# Patient Record
Sex: MALE | Race: WHITE | NOT HISPANIC OR LATINO | Employment: STUDENT | URBAN - METROPOLITAN AREA
[De-identification: names, ages, dates, MRNs, and addresses within clinical notes are randomized per-mention and may not be internally consistent; named-entity substitution may affect disease eponyms.]

---

## 2021-02-03 ENCOUNTER — HOSPITAL ENCOUNTER (EMERGENCY)
Facility: HOSPITAL | Age: 21
Discharge: HOME | End: 2021-02-03
Attending: EMERGENCY MEDICINE | Admitting: EMERGENCY MEDICINE
Payer: COMMERCIAL

## 2021-02-03 VITALS
HEIGHT: 69 IN | HEART RATE: 85 BPM | TEMPERATURE: 97.9 F | OXYGEN SATURATION: 98 % | DIASTOLIC BLOOD PRESSURE: 78 MMHG | WEIGHT: 220 LBS | RESPIRATION RATE: 16 BRPM | SYSTOLIC BLOOD PRESSURE: 133 MMHG | BODY MASS INDEX: 32.58 KG/M2

## 2021-02-03 DIAGNOSIS — K08.89 PAIN, DENTAL: Primary | ICD-10-CM

## 2021-02-03 PROCEDURE — 99283 EMERGENCY DEPT VISIT LOW MDM: CPT

## 2021-02-03 PROCEDURE — 99284 EMERGENCY DEPT VISIT MOD MDM: CPT | Performed by: PHYSICIAN ASSISTANT

## 2021-02-03 PROCEDURE — 64450 NJX AA&/STRD OTHER PN/BRANCH: CPT | Performed by: PHYSICIAN ASSISTANT

## 2021-02-03 RX ORDER — LIDOCAINE HYDROCHLORIDE 20 MG/ML
5 INJECTION, SOLUTION EPIDURAL; INFILTRATION; INTRACAUDAL; PERINEURAL ONCE
Status: COMPLETED | OUTPATIENT
Start: 2021-02-03 | End: 2021-02-03

## 2021-02-03 RX ORDER — BUPIVACAINE HYDROCHLORIDE 5 MG/ML
5 INJECTION, SOLUTION EPIDURAL; INTRACAUDAL ONCE
Status: COMPLETED | OUTPATIENT
Start: 2021-02-03 | End: 2021-02-03

## 2021-02-03 RX ORDER — NAPROXEN 500 MG/1
500 TABLET ORAL 2 TIMES DAILY WITH MEALS
Qty: 28 TABLET | Refills: 0 | Status: SHIPPED | OUTPATIENT
Start: 2021-02-03 | End: 2021-02-17

## 2021-02-03 RX ADMIN — LIDOCAINE HYDROCHLORIDE 5 ML: 20 INJECTION, SOLUTION EPIDURAL; INFILTRATION; INTRACAUDAL; PERINEURAL at 18:00

## 2021-02-03 RX ADMIN — BUPIVACAINE HYDROCHLORIDE 5 ML: 5 INJECTION, SOLUTION EPIDURAL; INTRACAUDAL; PERINEURAL at 18:00

## 2021-02-04 NOTE — ED PROVIDER NOTES
History  Chief Complaint   Patient presents with    Dental Pain     Pt was at dentist 1/27/2021 and told he needed a root canal to right lower tooth, insurance does not take at that dentist  Pain is severe today  22 yo otherwise healthy male presents to the Emergency Department for evaluation of R lower tooth pain  He reportedly saw his dentist today and was advised that he needed an extraction; he refused this and presents here due to continued pain  Pain has been ongoing x 3 months  No fevers, chills, sweats, facial swelling, dysphagia or voice changes  No neck pain  Taking acetaminophen w/o relief  None       History reviewed  No pertinent past medical history  Past Surgical History:   Procedure Laterality Date    TONSILECTOMY AND ADNOIDECTOMY      TONSILLECTOMY      WISDOM TOOTH EXTRACTION         History reviewed  No pertinent family history  I have reviewed and agree with the history as documented  E-Cigarette/Vaping    E-Cigarette Use Current Every Day User     Cartridges/Day 6mg      E-Cigarette/Vaping Substances    Nicotine Yes     THC No     CBD No     Flavoring No     Other No     Unknown No      Social History     Tobacco Use    Smoking status: Former Smoker   Substance Use Topics    Alcohol use: Yes     Frequency: 2-4 times a month     Drinks per session: 3 or 4     Binge frequency: Never    Drug use: Not Currently     Types: Marijuana       Review of Systems   Constitutional: Negative for chills, diaphoresis and fever  HENT: Positive for dental problem  Eyes: Negative for visual disturbance  Respiratory: Negative for cough and shortness of breath  Cardiovascular: Negative for chest pain and palpitations  Gastrointestinal: Negative for abdominal pain, diarrhea, nausea and vomiting  Genitourinary: Negative for dysuria, flank pain and frequency  Musculoskeletal: Negative for arthralgias and myalgias  Skin: Negative for color change, rash and wound  Allergic/Immunologic: Negative for immunocompromised state  Neurological: Negative for dizziness and light-headedness  Hematological: Does not bruise/bleed easily  Psychiatric/Behavioral: Negative for confusion  The patient is not nervous/anxious  Physical Exam  Physical Exam  Vitals signs reviewed  Constitutional:       General: He is not in acute distress  Appearance: He is well-developed  He is not diaphoretic  HENT:      Head: Normocephalic and atraumatic  Comments: Numerous dental caries  No focal gingival/dental abscess  No sublingual bogginess or rigidity  Oropharynx clear     Mouth/Throat:      Mouth: Mucous membranes are moist    Eyes:      General: No scleral icterus  Pupils: Pupils are equal, round, and reactive to light  Neck:      Vascular: No JVD  Cardiovascular:      Rate and Rhythm: Normal rate and regular rhythm  Heart sounds: No murmur  No friction rub  No gallop  Pulmonary:      Effort: No respiratory distress  Breath sounds: No wheezing or rales  Skin:     General: Skin is warm and dry  Neurological:      Mental Status: He is alert and oriented to person, place, and time           Vital Signs  ED Triage Vitals [02/03/21 1617]   Temperature Pulse Respirations Blood Pressure SpO2   97 9 °F (36 6 °C) 85 16 133/78 98 %      Temp Source Heart Rate Source Patient Position - Orthostatic VS BP Location FiO2 (%)   Temporal Monitor Sitting Left arm --      Pain Score       Worst Possible Pain           Vitals:    02/03/21 1617   BP: 133/78   Pulse: 85   Patient Position - Orthostatic VS: Sitting         Visual Acuity      ED Medications  Medications   bupivacaine (PF) (MARCAINE) 0 5 % injection 5 mL (5 mL Injection Given 2/3/21 1800)   lidocaine (PF) (XYLOCAINE-MPF) 2 % injection 5 mL (5 mL Infiltration Given 2/3/21 1800)       Diagnostic Studies  Results Reviewed     None                 No orders to display              Procedures  Nerve block    Date/Time: 2/3/2021 6:00 PM  Performed by: Kiarra Atkinson PA-C  Authorized by: Kiarra Atkinson PA-C     Patient location:  ED  [place] Protocol:  Consent: Verbal consent obtained  Risks and benefits: risks, benefits and alternatives were discussed  Consent given by: patient  Patient understanding: patient states understanding of the procedure being performed  Patient consent: the patient's understanding of the procedure matches consent given  Procedure consent: procedure consent matches procedure scheduled  Relevant documents: relevant documents present and verified  Required items: required blood products, implants, devices, and special equipment available  Patient identity confirmed: verbally with patient      Indications:     Indications:  Pain relief  Location:     Body area:  Head    Head nerve blocked: infra-alveolar  Nerve type:  Peripheral    Laterality:  Right  Skin anesthesia (see MAR for exact dosages):     Skin anesthesia method:  None  Procedure details (see MAR for exact dosages): Block needle gauge:  25 G    Anesthetic injected:  Bupivacaine 0 5% w/o epi and lidocaine 2% w/o epi    Steroid injected:  None    Additive injected:  None    Injection procedure:  Anatomic landmarks identified, anatomic landmarks palpated, introduced needle, negative aspiration for blood and incremental injection  Post-procedure details:     Dressing:  None    Outcome:  Pain relieved    Patient tolerance of procedure: Tolerated well, no immediate complications             ED Course  ED Course as of Feb 04 1328   Wed Feb 03, 2021   1805 Dental block administered                                SBIRT 22yo+      Most Recent Value   SBIRT (25 yo +)   In order to provide better care to our patients, we are screening all of our patients for alcohol and drug use  Would it be okay to ask you these screening questions? Yes Filed at: 02/03/2021 4929   Initial Alcohol Screen: US AUDIT-C    1   How often do you have a drink containing alcohol?  0 Filed at: 02/03/2021 1622   2  How many drinks containing alcohol do you have on a typical day you are drinking? 0 Filed at: 02/03/2021 1622   3a  Male UNDER 65: How often do you have five or more drinks on one occasion? 0 Filed at: 02/03/2021 1622   3b  FEMALE Any Age, or MALE 65+: How often do you have 4 or more drinks on one occassion? 0 Filed at: 02/03/2021 1622   Audit-C Score  0 Filed at: 02/03/2021 1622   THOMAS: How many times in the past year have you    Used an illegal drug or used a prescription medication for non-medical reasons? Never Filed at: 02/03/2021 1622                    MDM  Number of Diagnoses or Management Options  Pain, dental: new and does not require workup  Diagnosis management comments: No e/o infectious process  Encouraged dental f/u for appropriate care       Amount and/or Complexity of Data Reviewed  Review and summarize past medical records: yes        Disposition  Final diagnoses:   Pain, dental     Time reflects when diagnosis was documented in both MDM as applicable and the Disposition within this note     Time User Action Codes Description Comment    2/3/2021  6:11 PM Nora Davis [K08 89] Pain, dental       ED Disposition     ED Disposition Condition Date/Time Comment    Discharge Stable Wed Feb 3, 2021  6:11 PM Chaim Bro discharge to home/self care  Follow-up Information     Follow up With Specialties Details Why Contact Info    [facility] and [address] [place] [address] Road  Schedule an appointment as soon as possible for a visit   [address]  [phone]          Discharge Medication List as of 2/3/2021  6:12 PM      START taking these medications    Details   naproxen (NAPROSYN) 500 mg tablet Take 1 tablet (500 mg total) by mouth 2 (two) times a day with meals for 14 days, Starting Wed 2/3/2021, Until Wed 2/17/2021, Normal           No discharge procedures on file      PDMP Review None          ED Provider  Electronically Signed by           Charlie Potter PA-C  02/04/21 2197

## 2022-07-12 ENCOUNTER — HOSPITAL ENCOUNTER (EMERGENCY)
Facility: HOSPITAL | Age: 22
Discharge: HOME | End: 2022-07-12
Attending: EMERGENCY MEDICINE
Payer: COMMERCIAL

## 2022-07-12 VITALS
DIASTOLIC BLOOD PRESSURE: 75 MMHG | WEIGHT: 220 LBS | SYSTOLIC BLOOD PRESSURE: 122 MMHG | BODY MASS INDEX: 33.34 KG/M2 | RESPIRATION RATE: 18 BRPM | TEMPERATURE: 97.9 F | HEART RATE: 89 BPM | OXYGEN SATURATION: 98 % | HEIGHT: 68 IN

## 2022-07-12 DIAGNOSIS — U07.1 COVID-19: Primary | ICD-10-CM

## 2022-07-12 DIAGNOSIS — J02.9 SORE THROAT: ICD-10-CM

## 2022-07-12 PROCEDURE — 96372 THER/PROPH/DIAG INJ SC/IM: CPT

## 2022-07-12 PROCEDURE — 99284 EMERGENCY DEPT VISIT MOD MDM: CPT

## 2022-07-12 PROCEDURE — 99284 EMERGENCY DEPT VISIT MOD MDM: CPT | Performed by: EMERGENCY MEDICINE

## 2022-07-12 RX ORDER — KETOROLAC TROMETHAMINE 30 MG/ML
15 INJECTION, SOLUTION INTRAMUSCULAR; INTRAVENOUS ONCE
Status: COMPLETED | OUTPATIENT
Start: 2022-07-12 | End: 2022-07-12

## 2022-07-12 RX ADMIN — DEXAMETHASONE 10 MG: 4 TABLET ORAL at 13:01

## 2022-07-12 RX ADMIN — KETOROLAC TROMETHAMINE 15 MG: 30 INJECTION, SOLUTION INTRAMUSCULAR at 13:01

## 2022-07-12 NOTE — ED PROVIDER NOTES
History  Chief Complaint   Patient presents with    Dehydration     Pt states he came to the er because he has covid and feels that he "needs fluids"  Has only had a cup of water  Also states having body aches  Patient is a 58-year-old male who presents with congestion, sore throat, cough, body aches x2 days  Patient reports that he took a home COVID test and was positive  Because of the sore throat, patient states that he has not been eating/drinking because it hurts his throat  He is still able to eat and drink, he has just choosing not to  Denies any drooling, change in voice, neck pain or stiffness  Prior to Admission Medications   Prescriptions Last Dose Informant Patient Reported? Taking?   naproxen (NAPROSYN) 500 mg tablet   No No   Sig: Take 1 tablet (500 mg total) by mouth 2 (two) times a day with meals for 14 days      Facility-Administered Medications: None       History reviewed  No pertinent past medical history  Past Surgical History:   Procedure Laterality Date    TONSILECTOMY AND ADNOIDECTOMY      TONSILLECTOMY      WISDOM TOOTH EXTRACTION         History reviewed  No pertinent family history  I have reviewed and agree with the history as documented  E-Cigarette/Vaping    E-Cigarette Use Current Every Day User     Cartridges/Day 6mg      E-Cigarette/Vaping Substances    Nicotine Yes     THC No     CBD No     Flavoring No     Other No     Unknown No      Social History     Tobacco Use    Smoking status: Former Smoker    Smokeless tobacco: Never Used   Vaping Use    Vaping Use: Every day    Substances: Nicotine   Substance Use Topics    Alcohol use: Yes    Drug use: Not Currently     Types: Marijuana       Review of Systems   Constitutional: Positive for fatigue  Negative for chills and fever  HENT: Positive for congestion, rhinorrhea and sore throat   Negative for dental problem, drooling, ear pain, facial swelling, tinnitus, trouble swallowing and voice change  Respiratory: Positive for cough  Negative for shortness of breath  Cardiovascular: Negative for chest pain and palpitations  Gastrointestinal: Negative for abdominal pain, constipation, diarrhea, nausea and vomiting  Genitourinary: Negative for dysuria, flank pain and hematuria  Musculoskeletal: Positive for myalgias  Negative for neck pain and neck stiffness  Skin: Negative for color change, pallor and rash  Neurological: Negative for dizziness, weakness, light-headedness, numbness and headaches  Physical Exam  Physical Exam  Vitals and nursing note reviewed  Constitutional:       General: He is not in acute distress  Appearance: Normal appearance  He is not ill-appearing, toxic-appearing or diaphoretic  HENT:      Head: Normocephalic and atraumatic  Right Ear: Tympanic membrane normal       Left Ear: Tympanic membrane normal       Nose: Congestion present  Mouth/Throat:      Mouth: Mucous membranes are moist       Pharynx: Oropharynx is clear  Posterior oropharyngeal erythema present  No oropharyngeal exudate  Eyes:      Extraocular Movements: Extraocular movements intact  Conjunctiva/sclera: Conjunctivae normal       Pupils: Pupils are equal, round, and reactive to light  Cardiovascular:      Rate and Rhythm: Normal rate and regular rhythm  Pulses: Normal pulses  Heart sounds: Normal heart sounds  No murmur heard  Pulmonary:      Effort: Pulmonary effort is normal  No respiratory distress  Breath sounds: Normal breath sounds  No stridor  No wheezing, rhonchi or rales  Chest:      Chest wall: No tenderness  Abdominal:      General: Bowel sounds are normal  There is no distension  Palpations: Abdomen is soft  Tenderness: There is no abdominal tenderness  There is no guarding or rebound  Musculoskeletal:      Cervical back: Normal range of motion and neck supple  No rigidity or tenderness  Right lower leg: No edema  Left lower leg: No edema  Lymphadenopathy:      Cervical: No cervical adenopathy  Skin:     General: Skin is warm and dry  Neurological:      General: No focal deficit present  Mental Status: He is alert and oriented to person, place, and time  Mental status is at baseline  Psychiatric:         Mood and Affect: Mood normal          Behavior: Behavior normal          Vital Signs  ED Triage Vitals   Temperature Pulse Respirations Blood Pressure SpO2   07/12/22 0957 07/12/22 0955 07/12/22 0955 07/12/22 0955 07/12/22 0955   97 9 °F (36 6 °C) 89 18 122/75 98 %      Temp Source Heart Rate Source Patient Position - Orthostatic VS BP Location FiO2 (%)   07/12/22 0957 07/12/22 0955 07/12/22 0955 07/12/22 0955 --   Temporal Monitor Sitting Left arm       Pain Score       07/12/22 1144       2           Vitals:    07/12/22 0955   BP: 122/75   Pulse: 89   Patient Position - Orthostatic VS: Sitting         Visual Acuity      ED Medications  Medications   ketorolac (TORADOL) injection 15 mg (15 mg Intramuscular Given 7/12/22 1301)   dexamethasone (DECADRON) tablet 10 mg (10 mg Oral Given 7/12/22 1301)       Diagnostic Studies  Results Reviewed     None                 No orders to display              Procedures  Procedures         ED Course                               SBIRT 22yo+    Flowsheet Row Most Recent Value   SBIRT (25 yo +)    In order to provide better care to our patients, we are screening all of our patients for alcohol and drug use  Would it be okay to ask you these screening questions? Yes Filed at: 07/12/2022 1145   Initial Alcohol Screen: US AUDIT-C     1  How often do you have a drink containing alcohol? 0 Filed at: 07/12/2022 1145   2  How many drinks containing alcohol do you have on a typical day you are drinking? 0 Filed at: 07/12/2022 1145   3a  Male UNDER 65: How often do you have five or more drinks on one occasion? 0 Filed at: 07/12/2022 1145   3b  FEMALE Any Age, or MALE 65+:  How often do you have 4 or more drinks on one occassion? 0 Filed at: 07/12/2022 1145   Audit-C Score 0 Filed at: 07/12/2022 1145   THOMAS: How many times in the past year have you    Used an illegal drug or used a prescription medication for non-medical reasons? Never Filed at: 07/12/2022 1145                    MDM  Number of Diagnoses or Management Options  COVID-19  Sore throat  Diagnosis management comments: Assessment and plan:  76-year-old male presenting with known COVID-19 and sore throat  Patient is well-appearing/nontoxic  Vital signs are stable  Discussed with patient supportive management including Tylenol/Motrin, drinking plenty of fluids, using honey and throat lozenges  Here, will treat with Decadron and Toradol  Counseled regarding return precautions which patient verbalized understanding of  Disposition  Final diagnoses:   COVID-19   Sore throat     Time reflects when diagnosis was documented in both MDM as applicable and the Disposition within this note     Time User Action Codes Description Comment    7/12/2022 12:58 PM Michael Mcghee Add [U07 1] COVID-19     7/12/2022 12:58 PM Michael Mcghee Add [J02 9] Sore throat       ED Disposition     ED Disposition   Discharge    Condition   Stable    Date/Time   Tue Jul 12, 2022 12:58 PM    Comment   Florinda Bell discharge to home/self care                 Follow-up Information     Follow up With Specialties Details Why Contact Info Additional Information    Vee West MD  Schedule an appointment as soon as possible for a visit in 3 days for re-evaluation [address]  [address]  [address]  [facility] [phone]        [address] Emergency Department Emergency Medicine Go to  As needed, If symptoms worsen, for re-evaluation [address] [place],[address] [id number]  [address] [facility] Emergency Department, [address] [facility] Ethan Goins Luige Jeevan 10 Discharge Medication List as of 7/12/2022  1:01 PM      CONTINUE these medications which have NOT CHANGED    Details   naproxen (NAPROSYN) 500 mg tablet Take 1 tablet (500 mg total) by mouth 2 (two) times a day with meals for 14 days, Starting Wed 2/3/2021, Until Wed 2/17/2021, Normal             No discharge procedures on file      PDMP Review     None          ED Provider  Electronically Signed by           Shalom Moreira DO  07/12/22 1235

## 2023-01-05 ENCOUNTER — TELEPHONE (OUTPATIENT)
Dept: PSYCHIATRY | Facility: CLINIC | Age: 23
End: 2023-01-05

## 2023-01-05 NOTE — TELEPHONE ENCOUNTER
Behavioral Health Outpatient Intake Questions    Referred By   : self    Please advise interviewee that they need to answer all questions truthfully to allow for best care, and any misrepresentations of information may affect their ability to be seen at this clinic   => Was this discussed? Yes     If Minor Child (under age 25)    Who is/are the legal guardian(s) of the child? Is there a custody agreement? No     • If "YES"- Custody orders must be obtained prior to scheduling the first appointment  • In addition, Consent to Treatment must be signed by all legal guardians prior to scheduling the first appointment    • If "NO"- Consent to Treatment must be signed by all legal guardians prior to scheduling the first appointment    [address] History -     Presenting Problem (in patient's own words): Help with life    Are there any communication barriers for this patient? Yes                                               If yes, please describe barriers: ADD  • If there is a unique situation, please refer to [facility] for final determination  Are you taking any psychiatric medications? No   •   If "YES" -What are they No   •   If "YES" -Who prescribes? Has the Patient previously received outpatient Talk Therapy or Medication Management from [place] [address]  Yes     •    If "YES"- When, Where and with Whom? •    If "NO" -Has Patient received these services elsewhere? •   If "YES" -When, Where, and with Whom? [facility] 5+ years ago    Has the Patient abused alcohol or other substances in the last 6 months ? Yes  There are suspicions of alcohol abuse reported by the patient  •  If "YES" -What substance, How much, How often? Liquors $ worth at a bar  Has decreased intake over the last couple of months    •  If illegal substance: Refer to [facility] (for S[facility]) or [facility]    •  If Alcohol in excess of 10 drinks per week:  Refer to [facility] (for [facility]) or [facility]    Legal History-     Is this treatment court ordered? No   • If "Yes"- refer to [address] [place] [address] for final determination  Has the Patient been convicted of a felony? No  •  If "Yes" -When, What? • Talk Therapy : Send to [address] [place] [address] for final determination   • Med Management: Send to Dr Antoinette Martin for final determination     ACCEPTED as a patient No  • If "Yes" Appointment Date:     Referred Elsewhere? No  • If “Yes” - (Where? Ex: [address], [address] [facility], etc )   Sent message to [address] [place] [address] for clinical eval    Name of Insurance Co: [employer]oses[employer] Amador22 ID# [id number]  Insurance Phone #  If ins is primary or secondary? Primary  If patient is a minor, parents information such as Name, D  O B of guarantor

## 2023-01-06 ENCOUNTER — TELEPHONE (OUTPATIENT)
Dept: PSYCHIATRY | Facility: CLINIC | Age: 23
End: 2023-01-06

## 2023-01-06 NOTE — TELEPHONE ENCOUNTER
Called patient to inform him that the clinical team determined that [facility] would be a better fit for him   Transferred call to [facility]

## 2023-01-23 ENCOUNTER — TELEPHONE (OUTPATIENT)
Dept: PSYCHIATRY | Facility: CLINIC | Age: 23
End: 2023-01-23

## 2023-01-23 NOTE — TELEPHONE ENCOUNTER
Behavioral Health Outpatient Intake Questions    Referred By   : PCP    Please advise interviewee that they need to answer all questions truthfully to allow for best care, and any misrepresentations of information may affect their ability to be seen at this clinic   => Was this discussed? Yes     If Minor Child (under age 25)    Who is/are the legal guardian(s) of the child? Is there a custody agreement? No     • If "YES"- Custody orders must be obtained prior to scheduling the first appointment  • In addition, Consent to Treatment must be signed by all legal guardians prior to scheduling the first appointment    • If "NO"- Consent to Treatment must be signed by all legal guardians prior to scheduling the first appointment    [address] History -     Presenting Problem (in patient's own words): depression and anger    Are there any communication barriers for this patient? No                                               If yes, please describe barriers:   • If there is a unique situation, please refer to [address] [place] [address] for final determination  Are you taking any psychiatric medications? No   •   If "YES" -What are they   •   If "YES" -Who prescribes? Has the Patient previously received outpatient Talk Therapy or Medication Management from [facility]   NO    •    If "YES"- When, Where and with Whom? •    If "NO" -Has Patient received these services elsewhere? •   If "YES" -When, Where, and with Whom? [facility] in middle school  Has the Patient abused alcohol or other substances in the last 6 months ? No  No concerns of substance abuse are reported  •  If "YES" -What substance, How much, How often? •  If illegal substance: Refer to [facility] (for [facility]) or [employer]  •  If Alcohol in excess of 10 drinks per week:  Refer to [facility] (for [facility]) or [address] History-     Is this treatment court ordered?  No   • If "Yes"- refer to [address] [place] [address] for final determination  Has the Patient been convicted of a felony? No  •  If "Yes" -When, What? • Talk Therapy : Send to [address] [place] [address] for final determination   • Med Management: Send to Dr Roger Kwan for final determination     ACCEPTED as a patient Yes  • If "Yes" Appointment Date: 2/1 @ 9a, with [facility]    Referred Elsewhere? No  • If “Yes” - (Where? Ex: [facility], [facility], [address] [place] [address], etc )       Name of Insurance Co: [employer] [id number] ID# [id number]  [id number] Phone #  If ins is primary or secondary? primary  If patient is a minor, parents information such as Name, D  O B of guarantor

## 2023-02-01 ENCOUNTER — SOCIAL WORK (OUTPATIENT)
Dept: BEHAVIORAL/MENTAL HEALTH CLINIC | Facility: CLINIC | Age: 23
End: 2023-02-01

## 2023-02-01 DIAGNOSIS — F31.78 BIPOLAR I DISORDER, MOST RECENT EPISODE MIXED, IN FULL REMISSION (HCC): Primary | ICD-10-CM
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
## 2023-02-15 ENCOUNTER — SOCIAL WORK (OUTPATIENT)
Dept: BEHAVIORAL/MENTAL HEALTH CLINIC | Facility: CLINIC | Age: 23
End: 2023-02-15

## 2023-02-15 DIAGNOSIS — F31.78 BIPOLAR I DISORDER, MOST RECENT EPISODE MIXED, IN FULL REMISSION (HCC): Primary | ICD-10-CM

## 2023-02-15 NOTE — PSYCH
Behavioral Health Psychotherapy Progress Note    Psychotherapy Provided: Individual Psychotherapy     No diagnosis found  Goals addressed in session: Goal 1     DATA: Met with client for his second visit  He reports that he did not go to his former girlfriends father's , but instead watched it online  He was emotional for it  He also reports that he just discovered he is going to be a father again, now with his present girlfriend  He became so emotional over this that he had to pull over his car to weep uncontrollably  He agrees that his mood dysregulation is a problem and is open to see a psychiatrist for medications  I email the request and informed him he will get a call about availability with a provider  During this session, this clinician used the following therapeutic modalities: Engagement Strategies    Substance Abuse was not addressed during this session  If the client is diagnosed with a co-occurring substance use disorder, please indicate any changes in the frequency or amount of use: Stage of change for addressing substance use diagnoses: No substance use/Not applicable    ASSESSMENT:  Daniele Mariee presents with a Euthymic/ normal mood  his affect is Normal range and intensity, which is congruent, with his mood and the content of the session  The client has made progress on their goals  Daniele Mariee presents with a none risk of suicide, none risk of self-harm, and none risk of harm to others  For any risk assessment that surpasses a "low" rating, a safety plan must be developed  A safety plan was indicated: no  If yes, describe in detail N/A    PLAN: Between sessions, Daniele Mariee will look at decreasing his low frustration tolerance  At the next session, the therapist will use Engagement Strategies to address coping skills  Behavioral Health Treatment Plan and Discharge Planning: Daniele Mariee is aware of and agrees to continue to work on their treatment plan   They have identified and are working toward their discharge goals  {YES    Visit start and stop times:    02/15/23  Start Time: 0855  Stop Time: 0940  Total Visit Time: 45 minutes

## 2023-03-20 ENCOUNTER — OFFICE VISIT (OUTPATIENT)
Dept: PSYCHIATRY | Facility: CLINIC | Age: 23
End: 2023-03-20

## 2023-03-20 DIAGNOSIS — F31.9 BIPOLAR AFFECTIVE DISORDER, REMISSION STATUS UNSPECIFIED (HCC): Primary | ICD-10-CM

## 2023-03-20 DIAGNOSIS — F41.8 OTHER SPECIFIED ANXIETY DISORDERS: ICD-10-CM

## 2023-03-20 RX ORDER — CARBAMAZEPINE 200 MG/1
200 TABLET ORAL 2 TIMES DAILY
Qty: 60 TABLET | Refills: 1 | Status: SHIPPED | OUTPATIENT
Start: 2023-03-20
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
## 2023-03-21 PROBLEM — F31.70 BIPOLAR AFFECTIVE DISORDER IN REMISSION (HCC): Status: ACTIVE | Noted: 2023-03-21

## 2023-03-21 PROBLEM — F41.8 OTHER SPECIFIED ANXIETY DISORDERS: Status: ACTIVE | Noted: 2023-03-21

## 2023-03-30 ENCOUNTER — SOCIAL WORK (OUTPATIENT)
Dept: BEHAVIORAL/MENTAL HEALTH CLINIC | Facility: CLINIC | Age: 23
End: 2023-03-30

## 2023-03-30 DIAGNOSIS — F31.70 BIPOLAR AFFECTIVE DISORDER IN REMISSION (HCC): Primary | ICD-10-CM
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
## 2023-04-04 PROBLEM — F31.78 BIPOLAR I DISORDER, MOST RECENT EPISODE MIXED, IN FULL REMISSION (HCC): Status: ACTIVE | Noted: 2023-04-04

## 2023-04-26 ENCOUNTER — OFFICE VISIT (OUTPATIENT)
Dept: PSYCHIATRY | Facility: CLINIC | Age: 23
End: 2023-04-26

## 2023-04-26 DIAGNOSIS — F31.0 BIPOLAR AFFECTIVE DISORDER, CURRENT EPISODE HYPOMANIC (HCC): Primary | ICD-10-CM

## 2023-04-26 DIAGNOSIS — F41.8 OTHER SPECIFIED ANXIETY DISORDERS: ICD-10-CM

## 2023-04-26 RX ORDER — ZIPRASIDONE HYDROCHLORIDE 20 MG/1
20 CAPSULE ORAL 2 TIMES DAILY WITH MEALS
Qty: 60 CAPSULE | Refills: 1 | Status: SHIPPED | OUTPATIENT
Start: 2023-04-26
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
## 2023-04-28 ENCOUNTER — SOCIAL WORK (OUTPATIENT)
Dept: BEHAVIORAL/MENTAL HEALTH CLINIC | Facility: CLINIC | Age: 23
End: 2023-04-28

## 2023-04-28 DIAGNOSIS — F31.78 BIPOLAR I DISORDER, MOST RECENT EPISODE MIXED, IN FULL REMISSION (HCC): Primary | ICD-10-CM
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
## 2023-05-11 ENCOUNTER — TELEPHONE (OUTPATIENT)
Dept: PSYCHIATRY | Facility: CLINIC | Age: 23
End: 2023-05-11

## 2023-05-11 NOTE — TELEPHONE ENCOUNTER
Patient is calling regarding cancelling an appointment      Date/Time: 5/12/23/3:00 PM    Reason: N/A    Patient was rescheduled: YES [x] NO []  If yes, when was Patient reschedule for:   6/2/23 at 3PM    Patient requesting call back to reschedule: YES [] NO [x]

## 2023-06-02 ENCOUNTER — SOCIAL WORK (OUTPATIENT)
Dept: BEHAVIORAL/MENTAL HEALTH CLINIC | Facility: CLINIC | Age: 23
End: 2023-06-02

## 2023-06-02 DIAGNOSIS — F31.78 BIPOLAR I DISORDER, MOST RECENT EPISODE MIXED, IN FULL REMISSION (HCC): Primary | ICD-10-CM

## 2023-06-02 NOTE — PSYCH
"Behavioral Health Psychotherapy Progress Note    Psychotherapy Provided: Individual Psychotherapy     1  Bipolar I disorder, most recent episode mixed, in full remission ([address] )            Goals addressed in session: Goal 1     DATA: Client reports that for the most part he is doing well with less anxiety and anger  His girlfriend is 5 months pregnant with some medical complications that he has handled well along with continued car problems that he has used excellent problem solving skills  He continues to read and practice the anger management skills from the book provided  During this session, this clinician used the following therapeutic modalities: Client-centered Therapy    Substance Abuse was not addressed during this session  If the client is diagnosed with a co-occurring substance use disorder, please indicate any changes in the frequency or amount of use:  Stage of change for addressing substance use diagnoses: No substance use/Not applicable    ASSESSMENT:  Balaji Kirby presents with a Euthymic/ normal mood  his affect is Normal range and intensity, which is congruent, with his mood and the content of the session  The client has made progress on their goals  Balaji Kirby presents with a none risk of suicide, none risk of self-harm, and none risk of harm to others  For any risk assessment that surpasses a \"low\" rating, a safety plan must be developed  A safety plan was indicated: no  If yes, describe in detail N/A    PLAN: Between sessions, Balaji Kirby will continue to explore coping skills  At the next session, the therapist will use Client-centered Therapy to address adding a multitude of coping skills  Behavioral Health Treatment Plan and Discharge Planning: Balaji Kirby is aware of and agrees to continue to work on their treatment plan  They have identified and are working toward their discharge goals   YES    Visit start and stop times:    06/02/23  Start Time: 1503  Stop Time: 1537  Total Visit " Time: 34 minutes

## 2023-06-16 ENCOUNTER — SOCIAL WORK (OUTPATIENT)
Dept: BEHAVIORAL/MENTAL HEALTH CLINIC | Facility: CLINIC | Age: 23
End: 2023-06-16
Payer: COMMERCIAL

## 2023-06-16 DIAGNOSIS — F31.78 BIPOLAR I DISORDER, MOST RECENT EPISODE MIXED, IN FULL REMISSION (HCC): Primary | ICD-10-CM

## 2023-06-16 PROCEDURE — 90834 PSYTX W PT 45 MINUTES: CPT | Performed by: COUNSELOR

## 2023-06-22 NOTE — PSYCH
"Behavioral Health Psychotherapy Progress Note    Psychotherapy Provided: Individual Psychotherapy     No diagnosis found  Goals addressed in session: Goal 1     DATA: Met with Madi Chappell who is appearing more calm than the past few sessions  He still is without his car and using other means  We explored his home life and how he is planning on moving in the future along with his inter-personal communication skills he is using with his girlfriend who is pregnant  During this session, this clinician used the following therapeutic modalities: Client-centered Therapy    Substance Abuse was not addressed during this session  If the client is diagnosed with a co-occurring substance use disorder, please indicate any changes in the frequency or amount of use: Stage of change for addressing substance use diagnoses: No substance use/Not applicable    ASSESSMENT:  Indio Diane presents with a Euthymic/ normal mood  his affect is Normal range and intensity, which is congruent, with his mood and the content of the session  The client has made progress on their goals  Indio Diane presents with a none risk of suicide, none risk of self-harm, and none risk of harm to others  For any risk assessment that surpasses a \"low\" rating, a safety plan must be developed  A safety plan was indicated: no  If yes, describe in detail N/A    PLAN: Between sessions, Indio Diane will continue to look for healthy coping skills  At the next session, the therapist will use Client-centered Therapy to address coping on life's terms  Behavioral Health Treatment Plan and Discharge Planning: Indio Diane is aware of and agrees to continue to work on their treatment plan  They have identified and are working toward their discharge goals   YES  Visit start and stop times:    06/22/23  Start Time: 1456  Stop Time: 1545  Total Visit Time: 49 minutes  "

## 2023-06-30 ENCOUNTER — SOCIAL WORK (OUTPATIENT)
Dept: BEHAVIORAL/MENTAL HEALTH CLINIC | Facility: CLINIC | Age: 23
End: 2023-06-30
Payer: COMMERCIAL

## 2023-06-30 DIAGNOSIS — F41.8 OTHER SPECIFIED ANXIETY DISORDERS: Primary | ICD-10-CM

## 2023-06-30 PROCEDURE — 90834 PSYTX W PT 45 MINUTES: CPT | Performed by: COUNSELOR

## 2023-06-30 NOTE — PSYCH
Outpatient Behavioral Health Psychotherapy Treatment Plan    Justo [place]  2000     Date of Initial Psychotherapy Assessment: ***   Date of Current Treatment Plan: 06/30/23  Treatment Plan Target Date: ***  Treatment Plan Expiration Date: ***    Diagnosis:   No diagnosis found.     Area(s) of Need: ***    Long Term Goal 1 (in the client's own words): ***    Stage of Change: {SL AMB PSYCH STAGE OF DWRKZR:12715}    Target Date for completion: ***     Anticipated therapeutic modalities: ***     People identified to complete this goal: ***      Objective 1: (identify the means of measuring success in meeting the objective): ***      Objective 2: (identify the means of measuring success in meeting the objective): ***      Long Term Goal 2 (in the client's own words): ***    Stage of Change: {SL AMB PSYCH STAGE OF RUXALU:73453}    Target Date for completion: ***     Anticipated therapeutic modalities: ***     People identified to complete this goal: ***      Objective 1: (identify the means of measuring success in meeting the objective): ***      Objective 2: (identify the means of measuring success in meeting the objective): ***     Long Term Goal 3 (in the client's own words): ***    Stage of Change: {SL AMB PSYCH STAGE OF HMKRGT:64249}    Target Date for completion: ***     Anticipated therapeutic modalities: ***     People identified to complete this goal: ***      Objective 1: (identify the means of measuring success in meeting the objective): ***      Objective 2: (identify the means of measuring success in meeting the objective): ***     I am currently under the care of a [facility] psychiatric provider: {YES/NO:20200}    My [facility] psychiatric provider is: ***    I am currently taking psychiatric medications: {SL AMB TAKING PSYCH MEDS:36945}    I feel that I will be ready for discharge from mental health care when I reach the following (measurable goal/objective): ***    For children and adults who have a legal guardian:   Has there been any change to custody orders and/or guardianship status? {Yes/No/NA:66217}. If yes, attach updated documentation. I have {SL AMB PSYCH CREATED/UPDATED:69539} my Crisis Plan and have been offered a copy of this plan    [address]: Diagnosis and Treatment Plan explained to [facility] {acknowledge/does not acknowledge:23721} an understanding of their diagnosis. Lazarus Seltzer {SL AMB PSYCH AGREE/DISAGREE:36644} this treatment plan. I have been offered a copy of this Treatment Plan.  {YES/NO:20200}

## 2023-07-03 NOTE — PSYCH
Behavioral Health Psychotherapy Progress Note    Psychotherapy Provided: Individual Psychotherapy     1. 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 He does report that his pregnant girlfriend has moved back home to her parents for now due to the never ending chaos in his home where multiple family members live. We discussed him looking and planning for the future with moving out when the baby is born but first obtaining a car that works  During this session, this clinician used the following therapeutic modalities: Engagement Strategies    Substance Abuse was not addressed during this session. If the client is diagnosed with a co-occurring substance use disorder, please indicate any changes in the frequency or amount of use: Stage of change for addressing substance use diagnoses: No substance use/Not applicable    ASSESSMENT:  Isaac Rehman presents with a Euthymic/ normal mood. his affect is Normal range and intensity, which is congruent, with his mood and the content of the session. The client has made progress on their goals. Isaac Rehman presents with a none risk of suicide, none risk of self-harm, and none risk of harm to others. For any risk assessment that surpasses a "low" rating, a safety plan must be developed. A safety plan was indicated: no  If yes, describe in detail N/A    PLAN: Between sessions, Isaac Rehman will continue to explore coping without acting out with anger. At the next session, the therapist will use Engagement Strategies to address explore growth and how he is progressing without anger. Behavioral Health Treatment Plan and Discharge Planning: Isaac Rehman is aware of and agrees to continue to work on their treatment plan. They have identified and are working toward their discharge goals.  YES    Visit start and stop times:    07/03/23  Start Time: 1500  Stop Time: 1545  Total Visit Time: 45 minutes

## 2023-07-19 ENCOUNTER — SOCIAL WORK (OUTPATIENT)
Dept: BEHAVIORAL/MENTAL HEALTH CLINIC | Facility: CLINIC | Age: 23
End: 2023-07-19
Payer: COMMERCIAL

## 2023-07-19 DIAGNOSIS — F31.70 BIPOLAR AFFECTIVE DISORDER IN REMISSION (HCC): Primary | ICD-10-CM

## 2023-07-19 PROCEDURE — 90834 PSYTX W PT 45 MINUTES: CPT | Performed by: COUNSELOR

## 2023-07-25 NOTE — PSYCH
Behavioral Health Psychotherapy Progress Note    Psychotherapy Provided: Individual Psychotherapy     1. Bipolar affective disorder in remission [facility])            Goals addressed in session: Goal 1     DATA: Met with Kyung Cheung who reports that his grandmother who he was not close to has passed away. However he was tasked by the family to spread the word. He recently discovered his drivers license is suspended for lack of paying on fines. He continues to have verbal altercations with his brother who is verbally disrespectful to the wife. Work is status quo and his girlfriend continues to live with her parents for now while she is in her 2nd trimester. During this session, this clinician used the following therapeutic modalities: Engagement Strategies    Substance Abuse was not addressed during this session. If the client is diagnosed with a co-occurring substance use disorder, please indicate any changes in the frequency or amount of use: Stage of change for addressing substance use diagnoses: No substance use/Not applicable    ASSESSMENT:  Pravin Gracia presents with a Euthymic/ normal mood. his affect is Normal range and intensity, which is congruent, with his mood and the content of the session. The client has made progress on their goals. Pravin Gracia presents with a none risk of suicide, none risk of self-harm, and none risk of harm to others. For any risk assessment that surpasses a "low" rating, a safety plan must be developed. A safety plan was indicated: no  If yes, describe in detail N/A    PLAN: Between sessions, Pravin Gracia will explore getting a Temp license for work. At the next session, the therapist will use Engagement Strategies to address continuing stresses in life. Behavioral Health Treatment Plan and Discharge Planning: Pravin Gracia is aware of and agrees to continue to work on their treatment plan. They have identified and are working toward their discharge goals. YES    Visit start and stop times:    07/25/23  Start Time: 1500  Stop Time: 1545  Total Visit Time: 45 minutes

## 2023-08-25 ENCOUNTER — SOCIAL WORK (OUTPATIENT)
Dept: BEHAVIORAL/MENTAL HEALTH CLINIC | Facility: CLINIC | Age: 23
End: 2023-08-25
Payer: COMMERCIAL

## 2023-08-25 DIAGNOSIS — F31.78 BIPOLAR I DISORDER, MOST RECENT EPISODE MIXED, IN FULL REMISSION (HCC): Primary | ICD-10-CM

## 2023-08-25 PROCEDURE — 90834 PSYTX W PT 45 MINUTES: CPT | Performed by: COUNSELOR

## 2023-08-25 NOTE — PSYCH
Behavioral Health Psychotherapy Progress Note    Psychotherapy Provided: Individual Psychotherapy     1. Bipolar I disorder, most recent episode mixed, in full remission ([address])            Goals addressed in session: Goal 1     DATA: Met with Katerina Harris who reports he was upset with his girlfriend who is expecting their first child in Oct. The argument stems from him attempting to get housing for the both of them and to get away from his grandmothers house. The girlfriend reported that she did not want to live with him out of fear he would attempt to kick her out when they argue, even though she would be on the lease as well. She is presently staying with her parents but they both have plans to attend the follow up appointment for her pregnancy on Tuesday. He agrees that in order to not be distracted with questions and thus arguing he will agree to the appointment but but communicate with her this weekend. He is also going to list prioritzing his finances as to which is best for him to do with paying off bills and in which order. During this session, this clinician used the following therapeutic modalities: Client-centered Therapy    Substance Abuse was not addressed during this session. If the client is diagnosed with a co-occurring substance use disorder, please indicate any changes in the frequency or amount of use: Stage of change for addressing substance use diagnoses: No substance use/Not applicable    ASSESSMENT:  Mónica Rabago presents with a Angry and Anxious mood. his affect is Normal range and intensity, which is congruent, with his mood and the content of the session. The client has made progress on their goals. Mónica Rabago presents with a minimal risk of suicide, minimal risk of self-harm, and none risk of harm to others. For any risk assessment that surpasses a "low" rating, a safety plan must be developed.     A safety plan was indicated: no  If yes, describe in detail Denies SI/HI    PLAN: Between sessions, Gege Bryan will begin to prioritize bills in order to pay off the lowest first and work on towards the largest while taking self-care time over the weekend. . At the next session, the therapist will use Engagement Strategies to address coping, and putting life obligations in order and perspective while staying in his logical brain as opposed to his emotional braqin. .    Behavioral Health Treatment Plan and Discharge Planning: Gege Bryan is aware of and agrees to continue to work on their treatment plan. They have identified and are working toward their discharge goals.  YES    Visit start and stop times:    08/25/23  Start Time: 1500  Stop Time: 1540  Total Visit Time: 40 minutes

## 2023-09-07 ENCOUNTER — SOCIAL WORK (OUTPATIENT)
Dept: BEHAVIORAL/MENTAL HEALTH CLINIC | Facility: CLINIC | Age: 23
End: 2023-09-07
Payer: COMMERCIAL

## 2023-09-07 DIAGNOSIS — F31.78 BIPOLAR I DISORDER, MOST RECENT EPISODE MIXED, IN FULL REMISSION (HCC): Primary | ICD-10-CM

## 2023-09-07 PROCEDURE — 90834 PSYTX W PT 45 MINUTES: CPT | Performed by: COUNSELOR

## 2023-09-26 NOTE — PSYCH
Behavioral Health Psychotherapy Progress Note    Psychotherapy Provided: Individual Psychotherapy     1. Bipolar I disorder, most recent episode mixed, in full remission ([address])            Goals addressed in session: Goal 1     DATA: Met with Suzie De Jesus who is in a better space from the week prior. He reports that his girlfriend and he are preparing for the birth of their baby. We processed how life will change and the preparation he is putting in place  During this session, this clinician used the following therapeutic modalities: Engagement Strategies    Substance Abuse was not addressed during this session. If the client is diagnosed with a co-occurring substance use disorder, please indicate any changes in the frequency or amount of use: Stage of change for addressing substance use diagnoses: No substance use/Not applicable    ASSESSMENT:  Veronica Santana presents with a Euthymic/ normal mood. his affect is Normal range and intensity, which is congruent, with his mood and the content of the session. The client has made progress on their goals. Veronica Santana presents with a none risk of suicide, none risk of self-harm, and none risk of harm to others. For any risk assessment that surpasses a "low" rating, a safety plan must be developed. A safety plan was indicated: no  If yes, describe in detail N/A    PLAN: Between sessions, Veronica Santana will explore preparation for parenting. At the next session, the therapist will use Engagement Strategies to address coping skills. Behavioral Health Treatment Plan and Discharge Planning: Veronica Santana is aware of and agrees to continue to work on their treatment plan. They have identified and are working toward their discharge goals.  YES    Visit start and stop times:    09/26/23  Start Time: 1400  Stop Time: 1445  Total Visit Time: 45 minutes

## 2023-11-08 ENCOUNTER — SOCIAL WORK (OUTPATIENT)
Dept: BEHAVIORAL/MENTAL HEALTH CLINIC | Facility: CLINIC | Age: 23
End: 2023-11-08
Payer: COMMERCIAL

## 2023-11-08 DIAGNOSIS — F31.78 BIPOLAR I DISORDER, MOST RECENT EPISODE MIXED, IN FULL REMISSION (HCC): Primary | ICD-10-CM

## 2023-11-08 PROCEDURE — 90834 PSYTX W PT 45 MINUTES: CPT | Performed by: COUNSELOR

## 2023-11-08 NOTE — PSYCH
Behavioral Health Psychotherapy Progress Note    Psychotherapy Provided: Individual Psychotherapy     1. Bipolar I disorder, most recent episode mixed, in full remission ([address])            Goals addressed in session: Goal 1     DATA: Met with Horacio Tsang who reports that he is having difficulty with the mother of his recent child. He reports that she took him off the birth certificate and changed the babies name. We explored her motives and how does he and will deal with this in the future while keeping his anger in check  During this session, this clinician used the following therapeutic modalities: Client-centered Therapy    Substance Abuse was not addressed during this session. If the client is diagnosed with a co-occurring substance use disorder, please indicate any changes in the frequency or amount of use: . Stage of change for addressing substance use diagnoses: No substance use/Not applicable    ASSESSMENT:  Elo Valdez presents with a Anxious mood. his affect is Normal range and intensity, which is congruent, with his mood and the content of the session. The client has made progress on their goals. Elo Valdez presents with a none risk of suicide, none risk of self-harm, and none risk of harm to others. For any risk assessment that surpasses a "low" rating, a safety plan must be developed. A safety plan was indicated: no  If yes, describe in detail N/A    PLAN: Between sessions, Elo Valdez will process the options he has in order to live with or without contact with his ex. At the next session, the therapist will use Client-centered Therapy to address self-care and parenting. Behavioral Health Treatment Plan and Discharge Planning: Elo Valdez is aware of and agrees to continue to work on their treatment plan. They have identified and are working toward their discharge goals.  yes    Visit start and stop times:    11/08/23  Start Time: 1500  Stop Time: 1545  Total Visit Time: 45 minutes

## 2024-01-05 ENCOUNTER — SOCIAL WORK (OUTPATIENT)
Dept: BEHAVIORAL/MENTAL HEALTH CLINIC | Facility: CLINIC | Age: 24
End: 2024-01-05
Payer: COMMERCIAL

## 2024-01-05 DIAGNOSIS — F31.70 BIPOLAR AFFECTIVE DISORDER IN REMISSION (HCC): Primary | ICD-10-CM

## 2024-01-05 PROCEDURE — 90832 PSYTX W PT 30 MINUTES: CPT | Performed by: COUNSELOR

## 2024-01-05 NOTE — BH CRISIS PLAN
"Client Name: Ancelmo Griffin       Client YOB: 2000  : 2000     Treatment Team (include name and contact information):      Psychotherapist: Eduardo Aquino, Ph.D., [facility]     Psychiatrist: N/A              Release of information completed: No     \" Pending              Release of information completed: YES     Other (Specify Role):                           Release of information completed:      Other (Specify Role):               Release of information completed: YES     Healthcare Provider  Ness Perry MD  [address]  [facility] [address]       Type of Plan              * Child plans (children 14 yo and younger) must be completed and signed by the child's legal guardian              * Plans for all individuals 13 yo and above must be signed by the client.      Plan Type: adolescent/adult (14 and over) Initial        My Personal Strengths are (in the client's own words):  Independent, speaks his mind with honesty,     The stressors and triggers that may put me at risk are:  being physically tired, feeling a lack of control, being treated unfairly, people (describe - names, etc) immediate family that he resides with and places (describe) Home in which he resides with 7 family members     Coping skills I can use to keep myself calm and safe:  Listen to music, Call a friend or family member and Increased contact with professional supports     Coping skills/supports I can use to maintain abstinence from substance use:   N/A     The people that provide me with help and support: (Include name, contact, and how they can help)              Support person #1: No one identified                          * Phone number:                           * How can they help me?               Support person #2:                          * Phone number:                           * How can they help me?                 Support person #3:                           * Phone " number:                          * How can they help me?     In the past, the following has helped me in times of crisis:               Being in a quiet space, Taking medications, Talking to a professional on the telephone and Going into the hospital        If it is an emergency and you need immediate help, call 9-1-1     If there is a possibility of danger to yourself or others, call the following crisis hotline resources:      Adult Crisis Numbers  Suicide Prevention Hotline - Dial 9-8-8  [facility]: [phone]  [facility]: [phone]  [facility]: [phone]  [facility]: [phone]  [place]: [phone]  [facility]: [phone]  [facility]: [phone]  [facility]: [phone] (daytime).                                                   [phone] (after hours, weekends, holidays)                 Child/Adolescent Crisis Numbers              [facility]: [phone]              [facility]: [phone]              [place]: [phone]              [place]: [phone]     Please note: Some [facility] do not have a separate number for Child/Adolescent specific crisis. If your county is not listed under Child/Adolescent, please call the adult number for your county                 National Talk to Text Line              All Ages - [phone]     In the event your feelings become unmanageable, and you cannot reach your support system, you will call [phone] immediately or go to the nearest hospital emergency room.            .

## 2024-01-05 NOTE — PSYCH
Outpatient Behavioral Health Psychotherapy Treatment Plan    Ancelmo Griffin  2000     Date of Initial Psychotherapy Assessment: ***   Date of Current Treatment Plan: 01/05/24  Treatment Plan Target Date: ***  Treatment Plan Expiration Date: ***    Diagnosis:   No diagnosis found.    Area(s) of Need: ***    Long Term Goal 1 (in the client's own words): ***    Stage of Change: {SL AMB PSYCH STAGE OF CHANGE:19880}    Target Date for completion: ***     Anticipated therapeutic modalities: ***     People identified to complete this goal: ***      Objective 1: (identify the means of measuring success in meeting the objective): ***      Objective 2: (identify the means of measuring success in meeting the objective): ***      Long Term Goal 2 (in the client's own words): ***    Stage of Change: {SL AMB PSYCH STAGE OF CHANGE:15618}    Target Date for completion: ***     Anticipated therapeutic modalities: ***     People identified to complete this goal: ***      Objective 1: (identify the means of measuring success in meeting the objective): ***      Objective 2: (identify the means of measuring success in meeting the objective): ***     Long Term Goal 3 (in the client's own words): ***    Stage of Change: {SL AMB PSYCH STAGE OF CHANGE:12021}    Target Date for completion: ***     Anticipated therapeutic modalities: ***     People identified to complete this goal: ***      Objective 1: (identify the means of measuring success in meeting the objective): ***      Objective 2: (identify the means of measuring success in meeting the objective): ***     I am currently under the care of a [facility] psychiatric provider: {YES/NO:20200}    My [facility] psychiatric provider is: ***    I am currently taking psychiatric medications: {SL AMB TAKING PSYCH MEDS:14177}    I feel that I will be ready for discharge from mental health care when I reach the following (measurable goal/objective): ***    For children and adults who have a  legal guardian:   Has there been any change to custody orders and/or guardianship status? {Yes/No/NA:72331}. If yes, attach updated documentation.    I have { AMB PSYCH CREATED/UPDATED:42642} my Crisis Plan and have been offered a copy of this plan    Behavioral Health Treatment Plan [facility]: Diagnosis and Treatment Plan explained to Ancelmo Griffin {acknowledge/does not acknowledge:81851} an understanding of their diagnosis. Ancelmo Griffin { AMB PSYCH AGREE/DISAGREE:79970} this treatment plan.    I have been offered a copy of this Treatment Plan. {YES/NO:20200}

## 2024-01-08 ENCOUNTER — TELEPHONE (OUTPATIENT)
Dept: PSYCHIATRY | Facility: CLINIC | Age: 24
End: 2024-01-08

## 2024-01-08 NOTE — PSYCH
"Behavioral Health Psychotherapy Progress Note    Psychotherapy Provided: Individual Psychotherapy     1. Bipolar affective disorder in remission (HCC)            Goals addressed in session: Goal 1     DATA: Met with Ancelmo who reports that he is feeling better overall and interacting with his sons mother but that they are just parenting, not intimate. We explored where he is with his feelings and emotions and he will consider journaling his thoughts  During this session, this clinician used the following therapeutic modalities: Client-centered Therapy    Substance Abuse was not addressed during this session. If the client is diagnosed with a co-occurring substance use disorder, please indicate any changes in the frequency or amount of use: Stage of change for addressing substance use diagnoses: No substance use/Not applicable    ASSESSMENT:  Ancelmo Griffin presents with a Euthymic/ normal mood.     his affect is Normal range and intensity, which is congruent, with his mood and the content of the session. The client has made progress on their goals.    Ancelmo Griffin presents with a none risk of suicide, none risk of self-harm, and none risk of harm to others.    For any risk assessment that surpasses a \"low\" rating, a safety plan must be developed.    A safety plan was indicated: no  If yes, describe in detail N/A    PLAN: Between sessions, Ancelmo Griffin will journal his thoughts to better understand his emotions. At the next session, the therapist will use Client-centered Therapy to address self-awareness.    Behavioral Health Treatment Plan and Discharge Planning: Ancelmo Griffin is aware of and agrees to continue to work on their treatment plan. They have identified and are working toward their discharge goals. yes    Visit start and stop times:    01/08/24  Start Time: 1500  Stop Time: 1530  Total Visit Time: 30 minutes  "

## 2024-02-02 ENCOUNTER — SOCIAL WORK (OUTPATIENT)
Dept: BEHAVIORAL/MENTAL HEALTH CLINIC | Facility: CLINIC | Age: 24
End: 2024-02-02
Payer: COMMERCIAL

## 2024-02-02 DIAGNOSIS — F41.8 OTHER SPECIFIED ANXIETY DISORDERS: ICD-10-CM

## 2024-02-02 DIAGNOSIS — F31.70 BIPOLAR AFFECTIVE DISORDER IN REMISSION (HCC): Primary | ICD-10-CM

## 2024-02-02 PROCEDURE — 90832 PSYTX W PT 30 MINUTES: CPT | Performed by: COUNSELOR

## 2024-02-02 NOTE — BH CRISIS PLAN
Client Name: Ancelmo Griffin       Client YOB: 2000    [facility] Safety Plan      Creation Date: 2/2/24    Created By: SABRINA Fleming       Step 1: Warning Signs:   Warning Signs   gets threatening, low frustration tolerance   hostile   low frustration tolerance            Step 2: Internal Coping Strategies:   Internal Coping Strategies   humor   ignoring the problem            Step 3: People and social settings that provide distraction:   Name Contact Information   [place]   local [facility]           Step 4: People whom I can ask for help during a crisis:     Patient did not identify any contacts: Yes      Step 5: Professionals or agencies I can contact during a crisis:      Clinican/Agency Name Phone Emergency Contact    Eduardo Aquino [phone]       Local Emergency Department Emergency Department Phone Emergency Department Address    [place] [address] [place]        Crisis Phone Numbers:   Suicide Prevention Lifeline: Call or Text  209 Crisis Text Line: Text HOME to 635-610   Please note: Some [facility] do not have a separate number for Child/Adolescent specific crisis. If your county is not listed under Child/Adolescent, please call the adult number for your county      Adult Crisis Numbers: Child/Adolescent Crisis Numbers   [facility]: [phone] [employer]: [phone]   [facility]: [phone] [facility]: [phone]   [facility]: [phone] [place]: [phone]   [facility]: [phone] [place]: [phone]   [facility]: [phone]   [facility]: [phone]   [employer] [facility]: [phone]   [facility]: [phone] (daytime) [phone] (after hours, weekends, holidays)      Step 6: Making the environment safer (plan for lethal means safety):   Patient did not identify any lethal methods: Yes     Optional: What is most important to me and worth living for?       [place] Safety Plan. Alissa Mccloud and Matty Larsen. Used with permission of the authors.

## 2024-02-02 NOTE — PSYCH
"Behavioral Health Psychotherapy Progress Note    Psychotherapy Provided: Individual Psychotherapy     1. Bipolar affective disorder in remission (HCC)        2. Other specified anxiety disorders            Goals addressed in session: Goal 1     DATA: Met with Ancelmo and discussed his and his baby's mom with needing couples therapy in order to learn how to have healthy discussions with each other about raising their child while not in a relationship  During this session, this clinician used the following therapeutic modalities: Client-centered Therapy    Substance Abuse was not addressed during this session. If the client is diagnosed with a co-occurring substance use disorder, please indicate any changes in the frequency or amount of use: Stage of change for addressing substance use diagnoses: No substance use/Not applicable    ASSESSMENT:  Ancelmo Griffin presents with a Euthymic/ normal mood.     his affect is Normal range and intensity, which is congruent, with his mood and the content of the session. The client has made progress on their goals.     Ancelmo Griffin presents with a none risk of suicide, none risk of self-harm, and none risk of harm to others.    For any risk assessment that surpasses a \"low\" rating, a safety plan must be developed.    A safety plan was indicated: no  If yes, describe in detail N/A    PLAN: Between sessions, Ancelmo Griffin will explore healthy relationships. At the next session, the therapist will use Client-centered Therapy to address how to communicate in relationships.    Behavioral Health Treatment Plan and Discharge Planning: Ancelmo Griffin is aware of and agrees to continue to work on their treatment plan. They have identified and are working toward their discharge goals. yes    Visit start and stop times:    02/02/24  Start Time: 1500  Stop Time: 1530  Total Visit Time: 30 minutes  "

## 2024-02-02 NOTE — PSYCH
Outpatient Behavioral Health Psychotherapy Treatment Plan    Ancelmo Griffin  2000     Date of Initial Psychotherapy Assessment: ***   Date of Current Treatment Plan: 02/02/24  Treatment Plan Target Date: ***  Treatment Plan Expiration Date: ***    Diagnosis:   No diagnosis found.    Area(s) of Need: ***    Long Term Goal 1 (in the client's own words): ***    Stage of Change: {SL AMB PSYCH STAGE OF CHANGE:30465}    Target Date for completion: ***     Anticipated therapeutic modalities: ***     People identified to complete this goal: ***      Objective 1: (identify the means of measuring success in meeting the objective): ***      Objective 2: (identify the means of measuring success in meeting the objective): ***      Long Term Goal 2 (in the client's own words): ***    Stage of Change: {SL AMB PSYCH STAGE OF CHANGE:48014}    Target Date for completion: ***     Anticipated therapeutic modalities: ***     People identified to complete this goal: ***      Objective 1: (identify the means of measuring success in meeting the objective): ***      Objective 2: (identify the means of measuring success in meeting the objective): ***     Long Term Goal 3 (in the client's own words): ***    Stage of Change: {SL AMB PSYCH STAGE OF CHANGE:02375}    Target Date for completion: ***     Anticipated therapeutic modalities: ***     People identified to complete this goal: ***      Objective 1: (identify the means of measuring success in meeting the objective): ***      Objective 2: (identify the means of measuring success in meeting the objective): ***     I am currently under the care of a [facility] psychiatric provider: {YES/NO:20200}    My [facility] psychiatric provider is: ***    I am currently taking psychiatric medications: {SL AMB TAKING PSYCH MEDS:52995}    I feel that I will be ready for discharge from mental health care when I reach the following (measurable goal/objective): ***    For children and adults who have a  legal guardian:   Has there been any change to custody orders and/or guardianship status? {Yes/No/NA:43778}. If yes, attach updated documentation.    I have { AMB PSYCH CREATED/UPDATED:99294} my Crisis Plan and have been offered a copy of this plan    Behavioral Health Treatment Plan [facility]: Diagnosis and Treatment Plan explained to Ancelmo Griffin {acknowledge/does not acknowledge:89505} an understanding of their diagnosis. Ancelmo Griffin { AMB PSYCH AGREE/DISAGREE:47243} this treatment plan.    I have been offered a copy of this Treatment Plan. {YES/NO:20200}

## 2024-02-23 ENCOUNTER — SOCIAL WORK (OUTPATIENT)
Dept: BEHAVIORAL/MENTAL HEALTH CLINIC | Facility: CLINIC | Age: 24
End: 2024-02-23
Payer: COMMERCIAL

## 2024-02-23 DIAGNOSIS — F31.78 BIPOLAR I DISORDER, MOST RECENT EPISODE MIXED, IN FULL REMISSION (HCC): Primary | ICD-10-CM

## 2024-02-23 PROCEDURE — 90834 PSYTX W PT 45 MINUTES: CPT | Performed by: COUNSELOR

## 2024-02-23 NOTE — PSYCH
"Behavioral Health Psychotherapy Progress Note    Psychotherapy Provided: Individual Psychotherapy     1. Bipolar I disorder, most recent episode mixed, in full remission (HCC)            Goals addressed in session: Goal 1     DATA: Met with Ancelmo and processed his recent arrest for strangulation and terroristic threats. He is presently out on $2500 bail. We processed his legal problems and explored his part  During this session, this clinician used the following therapeutic modalities: Client-centered Therapy    Substance Abuse was not addressed during this session. If the client is diagnosed with a co-occurring substance use disorder, please indicate any changes in the frequency or amount of use: Stage of change for addressing substance use diagnoses: No substance use/Not applicable    ASSESSMENT:  Ancelmo Griffin presents with a Euthymic/ normal mood.     his affect is Normal range and intensity, which is congruent, with his mood and the content of the session. The client has made progress on their goals.     Ancelmo Griffin presents with a none risk of suicide, none risk of self-harm, and none risk of harm to others.    For any risk assessment that surpasses a \"low\" rating, a safety plan must be developed.    A safety plan was indicated: no  If yes, describe in detail N/A    PLAN: Between sessions, Ancelmo Griffin will comply with bail conditions and process in individual. At the next session, the therapist will use Client-centered Therapy to address legal problems.    Behavioral Health Treatment Plan and Discharge Planning: Ancelmo Griffin is aware of and agrees to continue to work on their treatment plan. They have identified and are working toward their discharge goals. yes    Visit start and stop times:    02/23/24  Start Time: 1500  Stop Time: 1545  Total Visit Time: 45 minutes  "

## 2024-03-08 ENCOUNTER — SOCIAL WORK (OUTPATIENT)
Dept: BEHAVIORAL/MENTAL HEALTH CLINIC | Facility: CLINIC | Age: 24
End: 2024-03-08
Payer: COMMERCIAL

## 2024-03-08 DIAGNOSIS — F31.78 BIPOLAR I DISORDER, MOST RECENT EPISODE MIXED, IN FULL REMISSION (HCC): Primary | ICD-10-CM

## 2024-03-08 PROCEDURE — 90832 PSYTX W PT 30 MINUTES: CPT | Performed by: COUNSELOR

## 2024-03-08 NOTE — PSYCH
"Behavioral Health Psychotherapy Progress Note    Psychotherapy Provided: Individual Psychotherapy     1. Bipolar I disorder, most recent episode mixed, in full remission (HCC)            Goals addressed in session: Goal 1     DATA: Met with Ancelmo and discussed his legal problems and what he is doing until his preliminary hearing on 4/03/2024. We processed his raising his child from his first wife and how he is caring for her while there is a hearing with the second wife pending.  During this session, this clinician used the following therapeutic modalities: Client-centered Therapy    Substance Abuse was not addressed during this session. If the client is diagnosed with a co-occurring substance use disorder, please indicate any changes in the frequency or amount of use: Stage of change for addressing substance use diagnoses: No substance use/Not applicable    ASSESSMENT:  Ancelmo Griffin presents with a Euthymic/ normal mood.     his affect is Normal range and intensity, which is congruent, with his mood and the content of the session. The client has made progress on their goals.    Ancelmo Griffin presents with a none risk of suicide, none risk of self-harm, and none risk of harm to others.    For any risk assessment that surpasses a \"low\" rating, a safety plan must be developed.    A safety plan was indicated: no  If yes, describe in detail N/A    PLAN: Between sessions, Ancelmo Griffin will Cont to apply for . At the next session, the therapist will use Client-centered Therapy to address self-care.    Behavioral Health Treatment Plan and Discharge Planning: Ancelmo Griffin is aware of and agrees to continue to work on their treatment plan. They have identified and are working toward their discharge goals. yes    Visit start and stop times:    03/08/24  Start Time: 1458  Stop Time: 1535  Total Visit Time: 37 minutes  "

## 2024-03-22 ENCOUNTER — SOCIAL WORK (OUTPATIENT)
Dept: BEHAVIORAL/MENTAL HEALTH CLINIC | Facility: CLINIC | Age: 24
End: 2024-03-22
Payer: COMMERCIAL

## 2024-03-22 DIAGNOSIS — F31.70 BIPOLAR AFFECTIVE DISORDER IN REMISSION (HCC): Primary | ICD-10-CM

## 2024-03-22 DIAGNOSIS — F31.78 BIPOLAR I DISORDER, MOST RECENT EPISODE MIXED, IN FULL REMISSION (HCC): ICD-10-CM

## 2024-03-22 DIAGNOSIS — F41.8 OTHER SPECIFIED ANXIETY DISORDERS: ICD-10-CM

## 2024-03-22 PROCEDURE — 90837 PSYTX W PT 60 MINUTES: CPT | Performed by: COUNSELOR

## 2024-03-22 NOTE — PSYCH
"Behavioral Health Psychotherapy Progress Note    Psychotherapy Provided: Individual Psychotherapy     1. Bipolar affective disorder in remission (HCC)        2. Bipolar I disorder, most recent episode mixed, in full remission (HCC)            Goals addressed in session: Goal 1     DATA: Met with Ancelmo and processed his pending criminal case.He is presently staying locally with friends, but he is also waiting on notification from the [employer] for representation. Presently he is still unemployed and not looking for jobs presently because he is collecting unemployment and not sure of what his status will be. We explored how he can continue to maintain his coping and lessen his anxiety.   During this session, this clinician used the following therapeutic modalities: Client-centered Therapy    Substance Abuse was not addressed during this session. If the client is diagnosed with a co-occurring substance use disorder, please indicate any changes in the frequency or amount of use: Stage of change for addressing substance use diagnoses: No substance use/Not applicable    ASSESSMENT:  Ancelmo Griffin presents with a Euthymic/ normal mood.     his affect is Normal range and intensity, which is congruent, with his mood and the content of the session. The client has made progress on their goals.    Ancelmo Griffin presents with a none risk of suicide, none risk of self-harm, and none risk of harm to others.    For any risk assessment that surpasses a \"low\" rating, a safety plan must be developed.    A safety plan was indicated: no  If yes, describe in detail N/A    PLAN: Between sessions, Ancelmo Griffin will develop increased coping skills. At the next session, the therapist will use Client-centered Therapy to address legal and adjustment to life.    Behavioral Health Treatment Plan and Discharge Planning: Ancelmo Griffin is aware of and agrees to continue to work on their treatment plan. They have identified and are working toward " their discharge goals. yes    Visit start and stop times:    03/22/24  Start Time: 1450  Stop Time: 1545  Total Visit Time: 55 minutes

## 2024-04-02 ENCOUNTER — TELEPHONE (OUTPATIENT)
Dept: PSYCHIATRY | Facility: CLINIC | Age: 24
End: 2024-04-02

## 2024-04-02 NOTE — TELEPHONE ENCOUNTER
LVM: MA termed 3/31/24. Informed client to call back if they have updated insurance/more information.

## 2024-06-26 ENCOUNTER — DOCUMENTATION (OUTPATIENT)
Dept: BEHAVIORAL/MENTAL HEALTH CLINIC | Facility: CLINIC | Age: 24
End: 2024-06-26

## 2024-06-26 NOTE — PROGRESS NOTES
Psychotherapy Discharge Summary    Preferred Name: Ancelmo Griffin  YOB: 2000    Admission date to psychotherapy: 2/01/2023    Referred by: self    Presenting Problem: Bipolar D/O    Course of treatment included : individual therapy     Progress/Outcome of Treatment Goals (brief summary of course of treatment) Attended all sessions and showed internal motivation    Treatment Complications (if any): Legal and insurance towards the end of Tx    Treatment Progress: fair    Current [facility] Psychiatric Provider: N/A    Discharge Medications include: N/A    Discharge Date: 6/26/2024    Discharge Diagnosis: Bi-polar 1    Criteria for Discharge:  Arrested on domestic charges. Moved out of area and lost insurance    Aftercare recommendations include (include specific referral names and phone numbers, if appropriate): follow up with another provider    Prognosis: fair

## 2025-02-11 ENCOUNTER — DOCUMENTATION (OUTPATIENT)
Dept: PSYCHIATRY | Facility: CLINIC | Age: 25
End: 2025-02-11

## 2025-02-11 NOTE — PSYCH
Psychiatric Discharge Summary- Administrative Discharge     Admit Date: See H&P for admit date  Discharge Date: 02/11/25     Discharge Diagnosis:   Patient Active Problem List   Diagnosis    Bipolar affective disorder in remission (HCC)    Other specified anxiety disorders    Bipolar I disorder, most recent episode mixed, in full remission (HCC)        Treating Physician: See Chart      Presenting Problems/Pertinent Findings: See Initial H&P     Course of Treatment:See Most Recent Med Management Progress Note    The patient's primary treating provider is no longer with practice.  Patient has either not responded to outreach, has not been seen in over a year, or has indicated they plan to transfer care to a new provider.  The chart is being administratively closed at this time.  For treatment course, please request past records when patient was in treatment with primary provider..      Discharge Medications:   Current Outpatient Medications:     naproxen (NAPROSYN) 500 mg tablet, Take 1 tablet (500 mg total) by mouth 2 (two) times a day with meals for 14 days, Disp: 28 tablet, Rfl: 0    ziprasidone (GEODON) 20 mg capsule, Take 1 capsule (20 mg total) by mouth 2 (two) times a day with meals, Disp: 60 capsule, Rfl: 1